# Patient Record
Sex: FEMALE | Race: AMERICAN INDIAN OR ALASKA NATIVE
[De-identification: names, ages, dates, MRNs, and addresses within clinical notes are randomized per-mention and may not be internally consistent; named-entity substitution may affect disease eponyms.]

---

## 2019-04-29 ENCOUNTER — HOSPITAL ENCOUNTER (EMERGENCY)
Dept: HOSPITAL 42 - ED | Age: 39
Discharge: HOME | End: 2019-04-29
Payer: COMMERCIAL

## 2019-04-29 VITALS — BODY MASS INDEX: 32.8 KG/M2

## 2019-04-29 VITALS — SYSTOLIC BLOOD PRESSURE: 128 MMHG | OXYGEN SATURATION: 97 % | DIASTOLIC BLOOD PRESSURE: 72 MMHG

## 2019-04-29 VITALS — RESPIRATION RATE: 18 BRPM | TEMPERATURE: 98.2 F | HEART RATE: 81 BPM

## 2019-04-29 DIAGNOSIS — R07.9: Primary | ICD-10-CM

## 2019-04-29 LAB
ALBUMIN SERPL-MCNC: 4.1 {NULL, G/DL} (ref 3–4.8)
ALBUMIN/GLOB SERPL: 1.4 {NULL, NULL} (ref 1.1–1.8)
ALT SERPL-CCNC: 15 {NULL, U/L} (ref 7–56)
APPEARANCE UR: CLEAR {NULL, NULL}
APTT BLD: 35 {NULL, SECONDS} (ref 26.9–38.3)
AST SERPL-CCNC: 27 {NULL, U/L} (ref 14–36)
BASOPHILS # BLD AUTO: 0.03 {NULL, K/MM3} (ref 0–2)
BASOPHILS NFR BLD: 0.3 {NULL, %} (ref 0–3)
BILIRUB UR-MCNC: NEGATIVE {NULL, NULL}
BUN SERPL-MCNC: 12 {NULL, MG/DL} (ref 7–21)
CALCIUM SERPL-MCNC: 10.1 {NULL, MG/DL} (ref 8.4–10.5)
COLOR UR: (no result) {NULL, NULL}
EOSINOPHIL # BLD: 0.2 {NULL, NULL} (ref 0–0.7)
EOSINOPHIL NFR BLD: 2.1 {NULL, %} (ref 1.5–5)
ERYTHROCYTE [DISTWIDTH] IN BLOOD BY AUTOMATED COUNT: 13.1 {NULL, %} (ref 11.5–14.5)
GFR NON-AFRICAN AMERICAN: > 60 {NULL, NULL}
GLUCOSE UR STRIP-MCNC: NEGATIVE {NULL, MG/DL}
HCG,QUALITATIVE URINE: NEGATIVE {NULL, NULL}
HGB BLD-MCNC: 13.6 {NULL, G/DL} (ref 12–16)
INR PPP: 0.96 {NULL, NULL}
LEUKOCYTE ESTERASE UR-ACNC: NEGATIVE {NULL, LEU/UL}
LYMPHOCYTES # BLD: 2.8 {NULL, NULL} (ref 1.2–3.4)
LYMPHOCYTES NFR BLD AUTO: 30.8 {NULL, %} (ref 22–35)
MCH RBC QN AUTO: 31.6 {NULL, PG} (ref 25–35)
MCHC RBC AUTO-ENTMCNC: 34.5 {NULL, G/DL} (ref 31–37)
MCV RBC AUTO: 91.4 {NULL, FL} (ref 80–105)
MONOCYTES # BLD AUTO: 0.8 {NULL, NULL} (ref 0.1–0.6)
MONOCYTES NFR BLD: 8.7 {NULL, %} (ref 1–6)
PH UR STRIP: 6 {NULL, NULL} (ref 4.7–8)
PLATELET # BLD: 235 {NULL, 10^3/UL} (ref 120–450)
PMV BLD AUTO: 9.4 {NULL, FL} (ref 7–11)
PROT UR STRIP-MCNC: NEGATIVE {NULL, MG/DL}
PROTHROMBIN TIME: 10.7 {NULL, SECONDS} (ref 9.4–12.5)
RBC # BLD AUTO: 4.31 {NULL, 10^6/UL} (ref 3.5–6.1)
RBC # UR STRIP: NEGATIVE {NULL, NULL}
SP GR UR STRIP: 1.02 {NULL, NULL} (ref 1–1.03)
TROPONIN I SERPL-MCNC: < 0.01 {NULL, NG/ML}
UROBILINOGEN UR STRIP-ACNC: 1 {NULL, E.U./DL}
WBC # BLD AUTO: 9.2 {NULL, 10^3/UL} (ref 4.5–11)

## 2019-04-29 NOTE — ED PDOC
Arrival/HPI





- General


Historian: Patient





- History of Present Illness


Narrative History of Present Illness (Text): 





04/29/19 17:11


Patient is a 37yo F with no significant PMH presenting to ED with chest pressure

for 1 week. She reports intermittent pressure in the middle of her chest that is

worse with laying down. She took tylenol at home which helped relieve her 

symptoms. She denies pain or radiation of the pressure, sweating, nausea, or 

palpitations. She has history of chest pain for the past year, which was worked 

up 2 months ago with CXR, stress test, EKG, and ECHO all of which resulted as 

negative. She denies fever, chills, abdominal pain, vomiting, diarrhea, constip

ation, rashes, panic attacks. 


Time/Duration: 1 week


Symptom Onset: Gradual


Symptom Course: Intermittent


Quality: Pressure


Severity Level: 10





<Lizz Jolley - Last Filed: 04/29/19 19:11>





<Bang Urias - Last Filed: 04/29/19 19:50>





- General


Chief Complaint: Chest Pain


Time Seen by Provider: 04/29/19 16:59





Past Medical History





- Psychiatric


Hx Substance Use: No





<Lizz Jolley - Last Filed: 04/29/19 19:11>





Family/Social History


Family/Social History: No Known Family HX


Smoking Status: Light Smoker < 10 Cigarettes Daily


Hx Alcohol Use: No


Hx Substance Use: No





<Lizz Jolley - Last Filed: 04/29/19 19:11>





Allergies/Home Meds





<Lizz Jolley - Last Filed: 04/29/19 19:11>





<Bang Urias - Last Filed: 04/29/19 19:50>


Allergies/Adverse Reactions: 


Allergies





No Known Allergies Allergy (Verified 04/29/19 16:57)


   








Home Medications: 


                                    Home Meds











 Medication  Instructions  Recorded  Confirmed


 


No Known Home Med  04/29/19 04/29/19














Review of Systems





- Review of Systems


Constitutional: Normal.  absent: Fevers


Eyes: Normal


ENT: Normal


Respiratory: Normal.  absent: SOB, Cough


Cardiovascular: Other (chest pressure).  absent: Chest Pain, Palpitations


Gastrointestinal: Normal.  absent: Abdominal Pain, Constipation, Diarrhea, 

Nausea, Vomiting


Genitourinary Female: Normal.  absent: Dysuria


Musculoskeletal: Neck Pain (chronic)


Skin: Normal


Neurological: Normal


Endocrine: Normal


Hemo/Lymphatic: Normal


Psychiatric: Normal.  absent: Anxiety





<Vianey Jolleysaadiakingsley - Last Filed: 04/29/19 19:11>





Physical Exam


Vital Signs Reviewed: Yes


Temperature: Afebrile


Blood Pressure: Normal


Pulse: Regular


Respiratory Rate: Normal


Appearance: Positive for: Well-Appearing, Non-Toxic, Comfortable


Pain Distress: None


Mental Status: Positive for: Alert and Oriented X 3





- Systems Exam


Head: Present: Atraumatic, Normocephalic


Pupils: Present: PERRL


Extroacular Muscles: Present: EOMI


Conjunctiva: Present: Normal


Mouth: Present: Moist Mucous Membranes


Neck: Present: Normal Range of Motion


Respiratory/Chest: Present: Clear to Auscultation, Good Air Exchange.  No: 

Respiratory Distress, Accessory Muscle Use


Cardiovascular: Present: Regular Rate and Rhythm, Normal S1, S2.  No: Murmurs


Abdomen: Present: Normal Bowel Sounds.  No: Tenderness, Distention, Peritoneal 

Signs


Upper Extremity: Present: Normal Inspection, Normal ROM.  No: Cyanosis, Edema


Lower Extremity: Present: Normal Inspection, Normal ROM.  No: Edema


Neurological: Present: GCS=15, CN II-XII Intact, Speech Normal, Motor Func 

Grossly Intact, Normal Sensory Function


Skin: Present: Normal Color.  No: Warm, Dry, Rashes


Psychiatric: Present: Alert, Oriented x 3, Normal Insight, Normal Concentration





<Lizz Jolley - Last Filed: 04/29/19 19:11>





Vital Signs











  Temp Pulse Resp BP Pulse Ox


 


 04/29/19 16:51  98.2 F  81  18  129/74  100














<Bang Urias - Last Filed: 04/29/19 19:50>





Medical Decision Making


ED Course and Treatment: 





04/29/19 17:21


Chest Pressure


- EKG: NSR@79bpm


- Labs


- CXR


- reassess 





04/29/19 17:25


Heart Score 1, correlating with low risk of adverse cardiac event. 


Perc criteria 0, no further workup for PE necessary. 


Troponin negative


Chest X-ray not showing any consolidations, fractures, or pneumothorax. 


Spoke with patient at length regarding nature and management of her symptoms, 

advised to follow up with cardiologist in 2 days and return to ED if symptoms 

worsen. Patient understands instructions and agreed. 








- RAD Interpretation


Radiology Orders: 











04/29/19 17:09


CHEST PORTABLE [RAD] Stat 














<Lizz Jolley - Last Filed: 04/29/19 19:11>


ED Course and Treatment: 








04/29/19 18:12


Patient Seen with Resident:


In agreement with resident note which contains more details about the patient. 

Patient seen and evaluated with resident. Came up with plan and treatment 

together.





04/29/19 19:49


pt seen with resdient. atypical cp x 2 week trop neg.lower heart score perc neg.

 recent neg stress. stable for dc. 





- RAD Interpretation


Radiology Orders: 











04/29/19 17:09


CHEST PORTABLE [RAD] Stat 














<Bang Urias - Last Filed: 04/29/19 19:50>





- PA / NP / Resident Statement


MD/ has reviewed & agrees with the documentation as recorded.


MD/ has examined the patient and agrees with the treatment plan.





<Bang Urias - Last Filed: 04/29/19 19:50>





Disposition/Present on Arrival





- Present on Arrival


Any Indicators Present on Arrival: No


History of DVT/PE: No


History of Uncontrolled Diabetes: No


Urinary Catheter: No


History of Decub. Ulcer: No


History Surgical Site Infection Following: None





- Disposition


Have Diagnosis and Disposition been Completed?: Yes


Disposition Time: 19:13





<Lizz Jolley - Last Filed: 04/29/19 19:11>





<Bang Urias - Last Filed: 04/29/19 19:50>





- Disposition


Diagnosis: 


 Chest pressure





Disposition: HOME/ ROUTINE


Condition: STABLE


Discharge Instructions (ExitCare):  Chest Pain That Is Not Caused by the Heart 

(DC), Quitting Smoking


Additional Instructions: 


Please follow up with your cardiologist within 2 days. 


Please follow up with your primary care doctor within 2 days. 


If symptoms worsen, return to the emergency department. 


Referrals: 


[facility] [place] up with primary


[facility] [Other] - Follow up with primary


Forms:  [place] (English)

## 2019-04-30 NOTE — CARD
--------------- APPROVED REPORT --------------





Date of service: 04/29/2019



EKG Measurement

Heart [id number]

MI [id number]

[id number]

[id number]

[id number]



<Conclusion>

Normal sinus rhythm with sinus arrhythmia

Normal ECG

## 2019-04-30 NOTE — RAD
Date of service: 



04/29/2019



HISTORY:

 chest pain 



COMPARISON:

No prior. 



TECHNIQUE:

1 view obtained.



FINDINGS:



LUNGS:

No active pulmonary disease.



PLEURA:

No significant pleural effusion identified, no pneumothorax apparent.



CARDIOVASCULAR:

No aortic atherosclerotic calcification present.



Normal cardiac size. No pulmonary vascular congestion. 



OSSEOUS STRUCTURES:

No significant abnormalities.



VISUALIZED UPPER ABDOMEN:

Normal.



OTHER FINDINGS:

None.



IMPRESSION:

No acute cardiopulmonary disease appreciated.